# Patient Record
Sex: FEMALE | Race: BLACK OR AFRICAN AMERICAN | Employment: OTHER | URBAN - METROPOLITAN AREA
[De-identification: names, ages, dates, MRNs, and addresses within clinical notes are randomized per-mention and may not be internally consistent; named-entity substitution may affect disease eponyms.]

---

## 2018-08-17 ENCOUNTER — HOSPITAL ENCOUNTER (OUTPATIENT)
Dept: CT IMAGING | Age: 73
Discharge: HOME | End: 2018-08-19
Payer: COMMERCIAL

## 2018-08-17 DIAGNOSIS — R13.10 PROBLEMS WITH SWALLOWING AND MASTICATION: ICD-10-CM

## 2018-08-17 DIAGNOSIS — L04.0 ABSCESS OF LYMPH NODE OF NECK: ICD-10-CM

## 2018-08-17 PROCEDURE — 70490 CT SOFT TISSUE NECK W/O DYE: CPT

## 2018-08-17 PROCEDURE — 70450 CT HEAD/BRAIN W/O DYE: CPT

## 2018-09-12 ENCOUNTER — HOSPITAL ENCOUNTER (OUTPATIENT)
Age: 73
Discharge: HOME | End: 2018-09-12
Payer: COMMERCIAL

## 2018-09-12 LAB
ALBUMIN SERPL-MCNC: 4.1 G/DL (ref 3.5–5.2)
ALP BLD-CCNC: 82 U/L (ref 35–104)
ALT SERPL-CCNC: 15 U/L (ref 0–32)
AMYLASE: 73 U/L (ref 20–100)
ANION GAP SERPL CALCULATED.3IONS-SCNC: 14 MMOL/L (ref 7–16)
AST SERPL-CCNC: 27 U/L (ref 0–31)
BASOPHILS ABSOLUTE: 0.03 E9/L (ref 0–0.2)
BASOPHILS RELATIVE PERCENT: 0.7 % (ref 0–2)
BILIRUB SERPL-MCNC: 0.4 MG/DL (ref 0–1.2)
BUN BLDV-MCNC: 11 MG/DL (ref 8–23)
C-REACTIVE PROTEIN: 0.2 MG/DL (ref 0–0.4)
CALCIUM SERPL-MCNC: 9.8 MG/DL (ref 8.6–10.2)
CHLORIDE BLD-SCNC: 100 MMOL/L (ref 98–107)
CHOLESTEROL, TOTAL: 196 MG/DL (ref 0–199)
CO2: 27 MMOL/L (ref 22–29)
CREAT SERPL-MCNC: 1 MG/DL (ref 0.5–1)
EOSINOPHILS ABSOLUTE: 0.13 E9/L (ref 0.05–0.5)
EOSINOPHILS RELATIVE PERCENT: 3 % (ref 0–6)
FOLATE: >20 NG/ML (ref 4.8–24.2)
GFR AFRICAN AMERICAN: >60
GFR NON-AFRICAN AMERICAN: >60 ML/MIN/1.73
GLUCOSE BLD-MCNC: 91 MG/DL (ref 74–109)
HCT VFR BLD CALC: 42.2 % (ref 34–48)
HDLC SERPL-MCNC: 52 MG/DL
HEMOGLOBIN: 13.8 G/DL (ref 11.5–15.5)
IMMATURE GRANULOCYTES #: 0.01 E9/L
IMMATURE GRANULOCYTES %: 0.2 % (ref 0–5)
LDL CHOLESTEROL CALCULATED: 114 MG/DL (ref 0–99)
LYMPHOCYTES ABSOLUTE: 2.53 E9/L (ref 1.5–4)
LYMPHOCYTES RELATIVE PERCENT: 58.6 % (ref 20–42)
MCH RBC QN AUTO: 29.3 PG (ref 26–35)
MCHC RBC AUTO-ENTMCNC: 32.7 % (ref 32–34.5)
MCV RBC AUTO: 89.6 FL (ref 80–99.9)
MONOCYTES ABSOLUTE: 0.45 E9/L (ref 0.1–0.95)
MONOCYTES RELATIVE PERCENT: 10.4 % (ref 2–12)
NEUTROPHILS ABSOLUTE: 1.17 E9/L (ref 1.8–7.3)
NEUTROPHILS RELATIVE PERCENT: 27.1 % (ref 43–80)
PDW BLD-RTO: 12.1 FL (ref 11.5–15)
PLATELET # BLD: 128 E9/L (ref 130–450)
PMV BLD AUTO: 12 FL (ref 7–12)
POTASSIUM SERPL-SCNC: 3.9 MMOL/L (ref 3.5–5)
RBC # BLD: 4.71 E12/L (ref 3.5–5.5)
SEDIMENTATION RATE, ERYTHROCYTE: 26 MM/HR (ref 0–20)
SODIUM BLD-SCNC: 141 MMOL/L (ref 132–146)
TOTAL PROTEIN: 7.9 G/DL (ref 6.4–8.3)
TRIGL SERPL-MCNC: 151 MG/DL (ref 0–149)
TSH SERPL DL<=0.05 MIU/L-ACNC: 3.67 UIU/ML (ref 0.27–4.2)
VITAMIN D 25-HYDROXY: 29 NG/ML (ref 30–100)
VLDLC SERPL CALC-MCNC: 30 MG/DL
WBC # BLD: 4.3 E9/L (ref 4.5–11.5)

## 2018-09-12 PROCEDURE — 86140 C-REACTIVE PROTEIN: CPT

## 2018-09-12 PROCEDURE — 82746 ASSAY OF FOLIC ACID SERUM: CPT

## 2018-09-12 PROCEDURE — 36415 COLL VENOUS BLD VENIPUNCTURE: CPT

## 2018-09-12 PROCEDURE — 85025 COMPLETE CBC W/AUTO DIFF WBC: CPT

## 2018-09-12 PROCEDURE — 82150 ASSAY OF AMYLASE: CPT

## 2018-09-12 PROCEDURE — 85651 RBC SED RATE NONAUTOMATED: CPT

## 2018-09-12 PROCEDURE — 84443 ASSAY THYROID STIM HORMONE: CPT

## 2018-09-12 PROCEDURE — 82306 VITAMIN D 25 HYDROXY: CPT

## 2018-09-12 PROCEDURE — 80061 LIPID PANEL: CPT

## 2018-09-12 PROCEDURE — 80053 COMPREHEN METABOLIC PANEL: CPT

## 2018-09-14 LAB
PANCREATIC AMYLASE: 39 U/L (ref 12–52)
SALIVARY AMYLASE: 32 U/L (ref 9–86)
TOTAL AMYLASE, ISO: 71 U/L (ref 30–110)

## 2018-10-25 ENCOUNTER — HOSPITAL ENCOUNTER (OUTPATIENT)
Dept: GENERAL RADIOLOGY | Age: 73
Discharge: HOME | End: 2018-10-27
Payer: COMMERCIAL

## 2018-10-25 DIAGNOSIS — R13.11 DYSPHAGIA, ORAL PHASE: ICD-10-CM

## 2018-10-25 PROCEDURE — 74220 X-RAY XM ESOPHAGUS 1CNTRST: CPT

## 2018-10-25 PROCEDURE — 2500000003 HC RX 250 WO HCPCS: Performed by: OTOLARYNGOLOGY

## 2018-10-25 RX ADMIN — BARIUM SULFATE 176 ML: 960 POWDER, FOR SUSPENSION ORAL at 11:27

## 2018-11-30 ENCOUNTER — HOSPITAL ENCOUNTER (OUTPATIENT)
Dept: GENERAL RADIOLOGY | Age: 73
Discharge: HOME | End: 2018-12-02
Payer: COMMERCIAL

## 2018-11-30 DIAGNOSIS — Z12.31 SCREENING MAMMOGRAM, ENCOUNTER FOR: ICD-10-CM

## 2018-11-30 PROCEDURE — 77063 BREAST TOMOSYNTHESIS BI: CPT

## 2019-02-26 ENCOUNTER — HOSPITAL ENCOUNTER (OUTPATIENT)
Dept: GENERAL RADIOLOGY | Age: 74
Discharge: HOME | End: 2019-02-28
Payer: COMMERCIAL

## 2019-02-26 DIAGNOSIS — R92.2 BREAST DENSITY: ICD-10-CM

## 2019-02-26 DIAGNOSIS — R92.2 DENSE BREASTS: ICD-10-CM

## 2019-02-26 PROCEDURE — 76641 ULTRASOUND BREAST COMPLETE: CPT

## 2020-01-22 ENCOUNTER — HOSPITAL ENCOUNTER (OUTPATIENT)
Dept: GENERAL RADIOLOGY | Age: 75
Discharge: HOME | End: 2020-01-24
Payer: MEDICARE

## 2020-01-22 PROCEDURE — 77063 BREAST TOMOSYNTHESIS BI: CPT

## 2020-06-19 ENCOUNTER — HOSPITAL ENCOUNTER (OUTPATIENT)
Dept: ULTRASOUND IMAGING | Age: 75
Discharge: HOME | End: 2020-06-21
Payer: MEDICARE

## 2020-06-19 ENCOUNTER — HOSPITAL ENCOUNTER (OUTPATIENT)
Dept: GENERAL RADIOLOGY | Age: 75
Discharge: HOME | End: 2020-06-21
Payer: MEDICARE

## 2020-06-19 PROCEDURE — 76705 ECHO EXAM OF ABDOMEN: CPT

## 2020-06-19 PROCEDURE — 77080 DXA BONE DENSITY AXIAL: CPT

## 2020-06-19 PROCEDURE — 91200 LIVER ELASTOGRAPHY: CPT

## 2020-12-11 ENCOUNTER — TELEPHONE (OUTPATIENT)
Dept: PHARMACY | Facility: CLINIC | Age: 75
End: 2020-12-11

## 2020-12-11 NOTE — TELEPHONE ENCOUNTER
CLINICAL PHARMACY: ADHERENCE REVIEW  Identified care gap per [employer]; fills at [facility]: Statin adherence    Last Office Visit: unknown - PCP appears to be an affiliate provider    ASSESSMENT  STATIN ADHERENCE  (DANIEL Moyer = filled only once) Potential Fail Date: 12/20/2020; Needs 11 days to be adherent per 11/13/2020 claims data. Per Insurance Records   Rosuvastatin last filled on 8/28/2020 for a 90 day supply. Per [facility]:   Rosuvastatin last picked up on 8/31/2020 for 90 day supply. 1 refill remaining. Lab Results   Component Value Date    CHOL 196 09/12/2018    TRIG 151 (H) 09/12/2018    HDL 52 09/12/2018    LDLCALC 114 (H) 09/12/2018     ALT   Date Value Ref Range Status   09/12/2018 15 0 - 32 U/L Final     AST   Date Value Ref Range Status   09/12/2018 27 0 - 31 U/L Final     The 10-year ASCVD risk score (Mariela Calles, et al., 2013) is: 21.4%    Values used to calculate the score:      Age: 66 years      Sex: Female      Is Non- : Yes      Diabetic: No      Tobacco smoker: No      Systolic Blood Pressure: 708 mmHg      Is BP treated: No      HDL Cholesterol: 52 mg/dL      Total Cholesterol: 196 mg/dL     PLAN  Attempting to reach patient to review.  Left message asking for return call. Will attempt to contact the patient again to discuss rosuvastatin adherence. Will defer initiating a refill at the pharmacy until after the second attempt has been made.      Edgar HodgsonD, [facility]  Direct: [phone]  Department, toll free [phone], option 7

## 2020-12-14 NOTE — TELEPHONE ENCOUNTER
Called and spoke with patient - states that she has enough rosuvastatin to last until the end of the year. Discussed adherence, patient reports taking daily and not missing any doses. Advised patient that she can take advantage of her last fill in 2020 and push her first 2021 refill date out. Patient agreeable and will pick it up tomorrow. Called [facility] pharmacy - they will refill rosuvastatin and have ready for pick-up tomorrow. Billed through [employer]. Co-pay $0.00. Will sign off. Yaquelin Gayle, EdgarD, [facility]  Direct: [phone]  Department, toll free [phone], option 7           For Pharmacy Admin Tracking Only    PHSO: Yes  Total # of Interventions Recommended: 1  - New Order #: 0 New Medication Order Reason(s): Adherence  - Refills Provided #: 0  - Updated Order #: 0 Updated Order Reason(s):  Other  - Maintenance Safety Lab Monitoring #: 1  - New Therapy Lab Monitoring #: 1  Recommended intervention potential cost savings: 1  Total Interventions Accepted: 1  Time Spent (min): 15

## 2021-10-01 ENCOUNTER — HOSPITAL ENCOUNTER (OUTPATIENT)
Dept: GENERAL RADIOLOGY | Age: 76
Discharge: HOME | End: 2021-10-03
Payer: MEDICARE

## 2021-10-01 DIAGNOSIS — Z12.31 SCREENING MAMMOGRAM, ENCOUNTER FOR: ICD-10-CM

## 2021-10-01 PROCEDURE — 77063 BREAST TOMOSYNTHESIS BI: CPT

## 2022-01-25 ENCOUNTER — TELEPHONE (OUTPATIENT)
Dept: ADMINISTRATIVE | Age: 77
End: 2022-01-25

## 2022-01-25 NOTE — TELEPHONE ENCOUNTER
NP scheduled from the [facility]. Patient Appointment Form:      PCP: Dr. Humera Hsu  Referring: Dr. Humera Hsu    Has the Patient:    Seen a Cardiologist? No    Had a heart catheterization? No    Had heart surgery? No    Had a stress test or nuclear stress test? Yes 1/15/18 Epic     Had an echocardiogram? Yes 1/15/18 Epic     Had a vascular ultrasound? No    Had a 24/48 heart monitor or extended cardiac event monitor? No    Had recent blood work in the last 6 months? Had a pacemaker/ICD/ILR implant? No    Seen an Electrophysiologist? No        Will send records via: Prior echo and stress only in Epic. PCP recs should be at [place]      Date & time of appointment:  2/8/22 @ 1:00 with Dr. Rose Mejia.

## 2022-02-08 ENCOUNTER — OFFICE VISIT (OUTPATIENT)
Dept: CARDIOLOGY CLINIC | Age: 77
End: 2022-02-08
Payer: MEDICARE

## 2022-02-08 VITALS
SYSTOLIC BLOOD PRESSURE: 169 MMHG | HEIGHT: 63 IN | RESPIRATION RATE: 14 BRPM | BODY MASS INDEX: 25.52 KG/M2 | WEIGHT: 144 LBS | DIASTOLIC BLOOD PRESSURE: 84 MMHG | HEART RATE: 74 BPM

## 2022-02-08 DIAGNOSIS — R03.0 ELEVATED BLOOD PRESSURE READING: ICD-10-CM

## 2022-02-08 DIAGNOSIS — I34.0 NONRHEUMATIC MITRAL VALVE REGURGITATION: ICD-10-CM

## 2022-02-08 DIAGNOSIS — R94.31 ABNORMAL EKG: Primary | ICD-10-CM

## 2022-02-08 PROCEDURE — 99204 OFFICE O/P NEW MOD 45 MIN: CPT | Performed by: INTERNAL MEDICINE

## 2022-02-08 PROCEDURE — 1036F TOBACCO NON-USER: CPT | Performed by: INTERNAL MEDICINE

## 2022-02-08 PROCEDURE — G8399 PT W/DXA RESULTS DOCUMENT: HCPCS | Performed by: INTERNAL MEDICINE

## 2022-02-08 PROCEDURE — 1090F PRES/ABSN URINE INCON ASSESS: CPT | Performed by: INTERNAL MEDICINE

## 2022-02-08 PROCEDURE — 1123F ACP DISCUSS/DSCN MKR DOCD: CPT | Performed by: INTERNAL MEDICINE

## 2022-02-08 PROCEDURE — G8484 FLU IMMUNIZE NO ADMIN: HCPCS | Performed by: INTERNAL MEDICINE

## 2022-02-08 PROCEDURE — 93000 ELECTROCARDIOGRAM COMPLETE: CPT | Performed by: INTERNAL MEDICINE

## 2022-02-08 PROCEDURE — 4040F PNEUMOC VAC/ADMIN/RCVD: CPT | Performed by: INTERNAL MEDICINE

## 2022-02-08 PROCEDURE — G8417 CALC BMI ABV UP PARAM F/U: HCPCS | Performed by: INTERNAL MEDICINE

## 2022-02-08 PROCEDURE — G8427 DOCREV CUR MEDS BY ELIG CLIN: HCPCS | Performed by: INTERNAL MEDICINE

## 2022-02-08 NOTE — PROGRESS NOTES
Chief Complaint   Patient presents with    Abnormal Test Results       Patient Active Problem List    Diagnosis Date Noted    Abnormal EKG 02/08/2022    Elevated blood pressure reading 02/08/2022    Nonrheumatic mitral valve regurgitation 02/08/2022       Current Outpatient Medications   Medication Sig Dispense Refill    Multiple Vitamin (MULTI-VITAMINS PO) Take by mouth      Cholecalciferol (VITAMIN D PO) Take by mouth      Rosuvastatin Calcium (CRESTOR PO) Take 10 mg by mouth        Current Facility-Administered Medications   Medication Dose Route Frequency Provider Last Rate Last Admin    perflutren lipid microspheres (DEFINITY) injection 1.65 mg  1.5 mL IntraVENous ONCE PRN Alfredo Michael MD            Allergies   Allergen Reactions    Fish-Derived Products        Vitals:    02/08/22 1256 02/08/22 1257   BP: (!) 189/85 (!) 169/84   Pulse: 74    Resp: 14    Weight: 144 lb (65.3 kg)    Height: 5' 3\" (1.6 m)        Social History     Socioeconomic History    Marital status:      Spouse name: Not on file    Number of children: Not on file    Years of education: Not on file    Highest education level: Not on file   Occupational History    Not on file   Tobacco Use    Smoking status: Never Smoker    Smokeless tobacco: Never Used   Substance and Sexual Activity    Alcohol use: No    Drug use: No    Sexual activity: Never   Other Topics Concern    Not on file   Social History Narrative    Not on file     Social Determinants of Health     Financial Resource Strain:     Difficulty of Paying Living Expenses: Not on file   Food Insecurity:     Worried About Running Out of Food in the Last Year: Not on file    Jonathan of Food in the Last Year: Not on file   Transportation Needs:     Lack of Transportation (Medical): Not on file    Lack of Transportation (Non-Medical):  Not on file   Physical Activity:     Days of Exercise per Week: Not on file    Minutes of Exercise per Session: Not on file Stress:     Feeling of Stress : Not on file   Social Connections:     Frequency of Communication with Friends and Family: Not on file    Frequency of Social Gatherings with Friends and Family: Not on file    Attends Latter-day Services: Not on file    Active Member of [address] [place] [address] [employer] or Organizations: Not on file    Attends Club or Organization Meetings: Not on file    Marital Status: Not on file   Intimate Partner Violence:     Fear of Current or Ex-Partner: Not on file    Emotionally Abused: Not on file    Physically Abused: Not on file    Sexually Abused: Not on file   Housing Stability:     Unable to Pay for Housing in the Last Year: Not on file    Number of [place] in the Last Year: Not on file    Unstable Housing in the Last Year: Not on file       Family History   Problem Relation Age of Onset    High Cholesterol Maternal Grandfather     Heart Disease Father     Breast Cancer Mother     Hypertension Mother          SUBJECTIVE: April S Catarina Sever presents to the office today for consult - dr Omar Persaud - for cardiac evaluation. She complains of left upper abdominal discomfort previously w/u'd in 2018 with normal nuclear stress - NOT related to activity and denies   exertional chest pressure/discomfort, fatigue, irregular heart beat, lower extremity edema, near-syncope, orthopnea, palpitations, paroxysmal nocturnal dyspnea, syncope and tachypnea   Echo in 2018 claimed mild to moderate MR with normal LVEF  Was much more active prior to COVID with social events, dancing etc.    Recently EKG in PCP office sparked concern. No hx of HTN. Has BP cuff at home but does not use. .        Review of Systems:   Heart: as above   Lungs: as above   Eyes: denies changes in vision or discharge. Ears: denies changes in hearing or pain. Nose: denies epistaxis or masses   Throat: denies sore throat or trouble swallowing. Neuro: denies numbness, tingling, tremors. Skin: denies rashes or itching.    : denies hematuria, dysuria   GI: denies vomiting, diarrhea   Psych: denies mood changed, anxiety, depression. all others negative. Physical Exam   BP (!) 169/84   Pulse 74   Resp 14   Ht 5' 3\" (1.6 m)   Wt 144 lb (65.3 kg)   BMI 25.51 kg/m²   Constitutional: Oriented to person, place, and time. Well-developed and well-nourished. No distress. Head: Normocephalic and atraumatic. Eyes: EOM are normal. Pupils are equal, round, and reactive to light. Neck: Normal range of motion. Neck supple. No hepatojugular reflux and no JVD present. Carotid bruit is not present. Cardiovascular: Normal rate, regular rhythm, normal heart sounds and intact distal pulses. Exam reveals no gallop and no friction rub. No murmur heard. Pulmonary/Chest: Effort normal and breath sounds normal. No respiratory distress. No wheezes. No rales. Abdominal: Soft. Bowel sounds are normal. No distension and no mass. No tenderness. No rebound and no guarding. Musculoskeletal: Normal range of motion. No edema and no tenderness. Neurological: Alert and oriented to person, place, and time. Skin: Skin is warm and dry. No rash noted. Not diaphoretic. No erythema. Psychiatric: Normal mood and affect. Behavior is normal.     EKG:  normal sinus rhythm, nonspecific T waves changes, there are no previous tracings available for comparison.     ASSESSMENT AND PLAN:  Patient Active Problem List   Diagnosis    Abnormal EKG    Elevated blood pressure reading    Nonrheumatic mitral valve regurgitation     Patient with non cardiac symptoms previously worked up in 2018  Normal CV exam  Elevated BP today - counseled her on home BP sampling and documentation - she will provide it to me  Echo for LV size, wall thickness, and MR Dilcia Zavala M.D  [facility]

## 2022-03-11 ENCOUNTER — HOSPITAL ENCOUNTER (OUTPATIENT)
Dept: CARDIOLOGY | Age: 77
Discharge: HOME | End: 2022-03-11
Payer: MEDICARE

## 2022-03-11 DIAGNOSIS — I34.0 NONRHEUMATIC MITRAL VALVE REGURGITATION: ICD-10-CM

## 2022-03-11 LAB
LV EF: 65 %
LVEF MODALITY: NORMAL

## 2022-03-11 PROCEDURE — 93306 TTE W/DOPPLER COMPLETE: CPT

## 2022-03-14 ENCOUNTER — TELEPHONE (OUTPATIENT)
Dept: CARDIOLOGY CLINIC | Age: 77
End: 2022-03-14

## 2022-03-14 NOTE — TELEPHONE ENCOUNTER
----- Message from Juan Rios MD sent at 3/14/2022  6:34 AM EDT -----  Echo fine  Did she sample her home BPs?   She was supposed to provide u with that

## 2022-03-15 ENCOUNTER — TELEPHONE (OUTPATIENT)
Dept: CARDIOLOGY CLINIC | Age: 77
End: 2022-03-15

## 2022-03-15 NOTE — TELEPHONE ENCOUNTER
Per Dr. Zulma Boyd, after reviewing the blood pressure list, he stated no medication changes and follow-up with family physcian  with a log of blood pressures as well.

## 2022-04-25 ENCOUNTER — TELEPHONE (OUTPATIENT)
Dept: PHARMACY | Facility: CLINIC | Age: 77
End: 2022-04-25

## 2022-04-25 NOTE — LETTER
[place]  [address], [address]        April S Vivek   [address] [facility] [address]           04/27/22     Dear April S Argelia Osman,      We tried to reach you recently regarding your lisinopril 5mg daily, but were unable to reach you on the telephone. [facility] , if you [facility]'t already. If you are no longer taking or taking differently, please call us at the number below so that we can discuss this and update your medication profile. This medication can be filled for a 3-month supply to save you time and trips to the pharmacy.         Sincerely,   Kinjal Larson, PharmD, [facility]  Department, toll free: [phone], option 1

## 2022-04-25 NOTE — TELEPHONE ENCOUNTER
[facility] CLINICAL PHARMACY: ADHERENCE REVIEW  Identified care gap per [employer]: fills at [facility]: ACE/ARB adherence    Last Visit: 3/16/22 per [facility] Portal Mary Marvin MD - affiliated provider)    Patient also appears to be prescribed: statin     Patient not found in [facility]    [address] [place] [address] Records claims through 4/3/22 (YTD Pavel Moyer = Filled only once; Potential Fail Date: 6/11/22): Lisinopril 5mg last filled on 3/16/22 for 30 day supply. Next refill due: 4/15/22    Per Reconciled Dispense Report: #14/14ds last filled on 4/12/22 and 4/23/22    Per [facility]: have 14-ds ready for patient to , 0 refills remaining. [facility] has no further info/notes re why only 14-ds, just report that rx was written for #14, 0 refills. Per [employer] Portal and Reconcile dispense - appears may have been new rx for patient 3/16/22. BP Readings from Last 3 Encounters:   02/08/22 (!) 169/84   09/28/21 (!) 170/84   08/13/20 (!) 146/77     CrCl cannot be calculated (Patient's most recent lab result is older than the maximum 180 days allowed. ). [id number] Records claims through 4/3/22 (YTD Pavel Moyer = Filled only once; Potential Fail Date: 8/10/22): Rosuvastatin 10mg last filled on 3/16/22 for 90 day supply.  Next refill due: 6/14/22    Per Reconciled Dispense Report: #90/90ds, 5 refills remaining    Lab Results   Component Value Date    CHOL 196 09/12/2018    TRIG 151 (H) 09/12/2018    HDL 52 09/12/2018    LDLCALC 114 (H) 09/12/2018     ALT   Date Value Ref Range Status   09/12/2018 15 0 - 32 U/L Final     AST   Date Value Ref Range Status   09/12/2018 27 0 - 31 U/L Final     The ASCVD Risk score (Cornelius Taylor., et al., 2013) failed to calculate for the following reasons:    Cannot find a previous HDL lab    Cannot find a previous total cholesterol lab     PLAN  The following are interventions that have been identified:   - Patient eligible for 90 day supply of lisinopril - last 2 rxs appear to only be for 14-ds, 0 refills? (14ds currently READY to )    Attempting to reach patient to review.  Left message asking for return call. Called and spoke to patient's PCP office - spoke to Eliza Sterling - states patient is seen regularly, so she's not sure why only 14ds would have been ordered. She will check with Dr. Salome Scott, and if provider agreeable, will get [facility]-ds [facility].    No future appointments.     Tay Chen, PharmD, [facility]  Department, toll free: [phone], option 1

## 2022-04-27 NOTE — TELEPHONE ENCOUNTER
Attempting again to reach patient. Left another message and will send letter.      Spoke to patient's pharmacy - confirm 90ds lisinopril rx received and patient has not yet picked up the 14ds refill, so they will update and have 90-ds lisinopril refill ready for patient to .    =======================================================   For Pharmacy [id number] in place:  No   Recommendation Provided To: Provider: 1 via Called provider office and Pharmacy: 1   Intervention Detail: New Rx: 1, reason: Improve Adherence   Gap Closed?: Yes    Intervention Accepted By: Provider: 1 and Pharmacy: 1   Time Spent (min): 20

## 2022-04-29 NOTE — TELEPHONE ENCOUNTER
Patient left message returning call. Reached her back. Confirms she just took her last lisinopril tablet this morning and is taking daily.  Reviewed 90-ds ready to , which she will - patient appreciative.    =======================================================   For Pharmacy Admin Tracking Only - update to below/prev     CPA in place:  No   Recommendation Provided To: Provider: 1 via Called provider office, Patient/Caregiver: 1 via Telephone and Pharmacy: 1   Intervention Detail: New Rx: 1, reason: Improve Adherence   Gap Closed?: Yes    Intervention Accepted By: Provider: 1, Patient/Caregiver: 1 and Pharmacy: 1   Time Spent (min): 25

## 2022-10-05 ENCOUNTER — HOSPITAL ENCOUNTER (OUTPATIENT)
Dept: GENERAL RADIOLOGY | Age: 77
Discharge: HOME | End: 2022-10-07
Payer: MEDICARE

## 2022-10-05 VITALS — WEIGHT: 141 LBS | BODY MASS INDEX: 24.98 KG/M2 | HEIGHT: 63 IN

## 2022-10-05 DIAGNOSIS — Z12.31 SCREENING MAMMOGRAM, ENCOUNTER FOR: ICD-10-CM

## 2022-10-05 PROCEDURE — 77063 BREAST TOMOSYNTHESIS BI: CPT

## 2023-08-18 ENCOUNTER — HOSPITAL ENCOUNTER (OUTPATIENT)
Age: 78
End: 2023-08-18
Payer: MEDICARE

## 2023-08-18 ENCOUNTER — HOSPITAL ENCOUNTER (OUTPATIENT)
Dept: GENERAL RADIOLOGY | Age: 78
End: 2023-08-18
Payer: MEDICARE

## 2023-08-18 DIAGNOSIS — M46.1 SACROILIITIS, NOT ELSEWHERE CLASSIFIED (HCC): ICD-10-CM

## 2023-08-18 PROCEDURE — 72100 X-RAY EXAM L-S SPINE 2/3 VWS: CPT

## 2023-10-06 ENCOUNTER — HOSPITAL ENCOUNTER (OUTPATIENT)
Dept: GENERAL RADIOLOGY | Age: 78
End: 2023-10-06
Payer: MEDICARE

## 2023-10-06 DIAGNOSIS — Z12.31 BREAST CANCER SCREENING BY MAMMOGRAM: ICD-10-CM

## 2023-10-06 PROCEDURE — 77063 BREAST TOMOSYNTHESIS BI: CPT

## 2024-10-07 ENCOUNTER — HOSPITAL ENCOUNTER (OUTPATIENT)
Dept: GENERAL RADIOLOGY | Age: 79
Discharge: HOME | End: 2024-10-09
Payer: MEDICARE

## 2024-10-07 VITALS — WEIGHT: 143 LBS | HEIGHT: 63 IN | BODY MASS INDEX: 25.34 KG/M2

## 2024-10-07 DIAGNOSIS — Z12.31 ENCOUNTER FOR SCREENING MAMMOGRAM FOR BREAST CANCER: ICD-10-CM

## 2024-10-07 PROCEDURE — 77063 BREAST TOMOSYNTHESIS BI: CPT
